# Patient Record
Sex: MALE | Race: WHITE | HISPANIC OR LATINO | Employment: UNEMPLOYED | URBAN - METROPOLITAN AREA
[De-identification: names, ages, dates, MRNs, and addresses within clinical notes are randomized per-mention and may not be internally consistent; named-entity substitution may affect disease eponyms.]

---

## 2023-11-15 ENCOUNTER — HOSPITAL ENCOUNTER (EMERGENCY)
Facility: HOSPITAL | Age: 33
Discharge: HOME | End: 2023-11-15
Attending: EMERGENCY MEDICINE
Payer: MEDICAID

## 2023-11-15 VITALS
DIASTOLIC BLOOD PRESSURE: 101 MMHG | SYSTOLIC BLOOD PRESSURE: 154 MMHG | HEART RATE: 103 BPM | HEIGHT: 65 IN | BODY MASS INDEX: 47.03 KG/M2 | OXYGEN SATURATION: 96 % | TEMPERATURE: 98.8 F | RESPIRATION RATE: 18 BRPM | WEIGHT: 282.3 LBS

## 2023-11-15 PROCEDURE — 4500999001 HC ED NO CHARGE: Performed by: EMERGENCY MEDICINE

## 2023-11-15 PROCEDURE — 99281 EMR DPT VST MAYX REQ PHY/QHP: CPT | Performed by: EMERGENCY MEDICINE

## 2023-11-15 ASSESSMENT — PAIN - FUNCTIONAL ASSESSMENT: PAIN_FUNCTIONAL_ASSESSMENT: FLACC (FACE, LEGS, ACTIVITY, CRY, CONSOLABILITY)

## 2023-11-15 ASSESSMENT — COLUMBIA-SUICIDE SEVERITY RATING SCALE - C-SSRS
1. IN THE PAST MONTH, HAVE YOU WISHED YOU WERE DEAD OR WISHED YOU COULD GO TO SLEEP AND NOT WAKE UP?: NO
6. HAVE YOU EVER DONE ANYTHING, STARTED TO DO ANYTHING, OR PREPARED TO DO ANYTHING TO END YOUR LIFE?: NO
2. HAVE YOU ACTUALLY HAD ANY THOUGHTS OF KILLING YOURSELF?: NO

## 2023-12-27 ENCOUNTER — APPOINTMENT (OUTPATIENT)
Dept: RADIOLOGY | Facility: HOSPITAL | Age: 33
End: 2023-12-27
Payer: MEDICAID

## 2023-12-27 ENCOUNTER — PHARMACY VISIT (OUTPATIENT)
Dept: PHARMACY | Facility: CLINIC | Age: 33
End: 2023-12-27
Payer: MEDICAID

## 2023-12-27 ENCOUNTER — HOSPITAL ENCOUNTER (EMERGENCY)
Facility: HOSPITAL | Age: 33
Discharge: HOME | End: 2023-12-27
Attending: EMERGENCY MEDICINE
Payer: MEDICAID

## 2023-12-27 VITALS
WEIGHT: 250 LBS | DIASTOLIC BLOOD PRESSURE: 88 MMHG | HEART RATE: 89 BPM | TEMPERATURE: 98.6 F | HEIGHT: 72 IN | SYSTOLIC BLOOD PRESSURE: 120 MMHG | OXYGEN SATURATION: 96 % | BODY MASS INDEX: 33.86 KG/M2 | RESPIRATION RATE: 16 BRPM

## 2023-12-27 DIAGNOSIS — K04.7 DENTAL ABSCESS: Primary | ICD-10-CM

## 2023-12-27 LAB
ANION GAP SERPL CALC-SCNC: 14 MMOL/L (ref 10–20)
BASOPHILS # BLD AUTO: 0.03 X10*3/UL (ref 0–0.1)
BASOPHILS NFR BLD AUTO: 0.2 %
BUN SERPL-MCNC: 12 MG/DL (ref 6–23)
CALCIUM SERPL-MCNC: 9.2 MG/DL (ref 8.6–10.3)
CHLORIDE SERPL-SCNC: 104 MMOL/L (ref 98–107)
CO2 SERPL-SCNC: 26 MMOL/L (ref 21–32)
CREAT SERPL-MCNC: 1.37 MG/DL (ref 0.5–1.3)
EOSINOPHIL # BLD AUTO: 0.03 X10*3/UL (ref 0–0.7)
EOSINOPHIL NFR BLD AUTO: 0.2 %
ERYTHROCYTE [DISTWIDTH] IN BLOOD BY AUTOMATED COUNT: 14.2 % (ref 11.5–14.5)
GFR SERPL CREATININE-BSD FRML MDRD: 70 ML/MIN/1.73M*2
GLUCOSE SERPL-MCNC: 89 MG/DL (ref 74–99)
HCT VFR BLD AUTO: 43.9 % (ref 41–52)
HGB BLD-MCNC: 14.1 G/DL (ref 13.5–17.5)
IMM GRANULOCYTES # BLD AUTO: 0.05 X10*3/UL (ref 0–0.7)
IMM GRANULOCYTES NFR BLD AUTO: 0.4 % (ref 0–0.9)
LYMPHOCYTES # BLD AUTO: 1.55 X10*3/UL (ref 1.2–4.8)
LYMPHOCYTES NFR BLD AUTO: 11.4 %
MCH RBC QN AUTO: 27.5 PG (ref 26–34)
MCHC RBC AUTO-ENTMCNC: 32.1 G/DL (ref 32–36)
MCV RBC AUTO: 86 FL (ref 80–100)
MONOCYTES # BLD AUTO: 1.4 X10*3/UL (ref 0.1–1)
MONOCYTES NFR BLD AUTO: 10.3 %
NEUTROPHILS # BLD AUTO: 10.57 X10*3/UL (ref 1.2–7.7)
NEUTROPHILS NFR BLD AUTO: 77.5 %
NRBC BLD-RTO: 0 /100 WBCS (ref 0–0)
PLATELET # BLD AUTO: 315 X10*3/UL (ref 150–450)
POTASSIUM SERPL-SCNC: 3.9 MMOL/L (ref 3.5–5.3)
RBC # BLD AUTO: 5.12 X10*6/UL (ref 4.5–5.9)
SODIUM SERPL-SCNC: 140 MMOL/L (ref 136–145)
WBC # BLD AUTO: 13.6 X10*3/UL (ref 4.4–11.3)

## 2023-12-27 PROCEDURE — 99284 EMERGENCY DEPT VISIT MOD MDM: CPT | Performed by: EMERGENCY MEDICINE

## 2023-12-27 PROCEDURE — 70491 CT SOFT TISSUE NECK W/DYE: CPT

## 2023-12-27 PROCEDURE — 36415 COLL VENOUS BLD VENIPUNCTURE: CPT

## 2023-12-27 PROCEDURE — 85025 COMPLETE CBC W/AUTO DIFF WBC: CPT

## 2023-12-27 PROCEDURE — 70491 CT SOFT TISSUE NECK W/DYE: CPT | Performed by: STUDENT IN AN ORGANIZED HEALTH CARE EDUCATION/TRAINING PROGRAM

## 2023-12-27 PROCEDURE — 80048 BASIC METABOLIC PNL TOTAL CA: CPT

## 2023-12-27 PROCEDURE — RXMED WILLOW AMBULATORY MEDICATION CHARGE

## 2023-12-27 PROCEDURE — 2550000001 HC RX 255 CONTRASTS

## 2023-12-27 RX ORDER — CLINDAMYCIN HYDROCHLORIDE 150 MG/1
300 CAPSULE ORAL 3 TIMES DAILY
Qty: 60 CAPSULE | Refills: 0 | Status: SHIPPED | OUTPATIENT
Start: 2023-12-27 | End: 2024-01-06

## 2023-12-27 RX ADMIN — IOHEXOL 75 ML: 350 INJECTION, SOLUTION INTRAVENOUS at 19:47

## 2023-12-27 ASSESSMENT — COLUMBIA-SUICIDE SEVERITY RATING SCALE - C-SSRS
2. HAVE YOU ACTUALLY HAD ANY THOUGHTS OF KILLING YOURSELF?: NO
6. HAVE YOU EVER DONE ANYTHING, STARTED TO DO ANYTHING, OR PREPARED TO DO ANYTHING TO END YOUR LIFE?: NO
1. IN THE PAST MONTH, HAVE YOU WISHED YOU WERE DEAD OR WISHED YOU COULD GO TO SLEEP AND NOT WAKE UP?: NO

## 2023-12-27 ASSESSMENT — PAIN SCALES - GENERAL: PAINLEVEL_OUTOF10: 10 - WORST POSSIBLE PAIN

## 2023-12-27 ASSESSMENT — PAIN - FUNCTIONAL ASSESSMENT: PAIN_FUNCTIONAL_ASSESSMENT: 0-10

## 2023-12-28 NOTE — DISCHARGE INSTRUCTIONS
If you are unable to be seen at your dentist, please call the oral maxillofacial surgeon listed above for further evaluation    Please take all antibiotics as prescribed even if symptoms improve    The antibiotics are not definitive care for your situation, your situation requires the affected tooth to be pulled out or infection will continue to recur

## 2023-12-28 NOTE — ED PROVIDER NOTES
Chief Complaint   Patient presents with    concern for lock jaw       33-year-old male arrives to the emergency department with a chief complaint of right facial swelling with a chronic dental abscess.  The patient states that he attempted to go to his dentist, however he was unable to open his mouth secondary to pain.  The patient has finished a 10-day regimen of penicillin with potassium for what has been a chronic dental abscess on the lower right, the patient was not able to have the tooth pulled.  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  On initial assessment, the patient is forcefully holding his jaw closed speaking between his teeth and attempting to motion things with hand stating that he cannot talk, the patient was speaking clearly to his family members in the room prior to my arrival as I walked in.  The patient is autistic, though is high functioning.  The patient is hemodynamically stable claiming that he is in a 10 out of 10 pain.  Patient is not able/willing to open his mouth for any inspection or oral assessment.  The patient does have a significant amount of swelling on the right side of his face including his right jaw and neck with cervical node involvement unilaterally on the right.  Patient is tender to the touch.      History provided by:  Patient   used: No         PmHx, PsHx, Allergies, Family Hx, social Hx reviewed as documented    Given the focused nature of the complaint, only related components review of systems were evaluated, and abnormalities indicated in HPI    Physical Exam:    General: Patient is AAOx3, appears well developed, well nourished, is a good historian, answers questions appropriately    HEENT: head normocephalic, atraumatic, PERRLA, EOMs intact, unable to assess the oropharynx secondary to the patient not being able to open his mouth.    Pulmonary: CTAB, no accessory muscle use, able to speak full clear sentences    Cardiac: HRRR, no murmurs, rubs  or gallops    GI: soft, non-tender, non-distended    Musculoskeletal: full weight bearing, LUNA, no joint effusions, clubbing or edema noted    Skin: Right-sided facial/neck swelling per HPI intact, no lesions or rashes noted, turgor is good.    Medical Decision Making  This patient was seen, treated, and evaluated in conjunction with Dr. Asad Yuen    Primary consideration for this patient is that he is unwilling to open his mouth secondary to the discomfort, other consideration that there is a limiting abscess, or damage from the chronic abscess that is leaving the patient unable to open his mouth.  A CT of soft tissue neck extending up into the right mandible will be used to further evaluate, this will be done with IV contrast requiring diagnostic blood work to further evaluate.    The patient's diagnostic blood work shows mild leukocytosis with a white blood cell count of 13.6, the patient's creatinine is shown to be slightly elevated 1.37, however I have no comparative studies, patient has no history of CKD.    The patient's CAT scan shows multiple areas of concern, that is very specific to the maxillofacial region.  Dr. Anaya with [facility] was consulted, the patient's CAT scan was reviewed in its entirety, he states that this read is very typical for this type of recurrent/chronic abscess, and that the patient requires the infected tooth to be pulled as definitive care, and that he does not require IV antibiotics.  He recommends clindamycin secondary to the patient already having Pen-Vee K and follow-up with his dentist.  If the patient cannot get into his dentist he is happy to see him in the office for definitive care.    The patient started on clindamycin 300 mg 3 times a day for 10 days, medications filled via meds to bed.     Upon reassessment of the patient by the attending physician, the patient was initially talking with his teeth clenched together refusing to open the mouth for the attending to assess,  once the patient was told that he was going to be discharged and follow-up, the patient clearly asked the IV to be taken out open his mouth to speak, indicating a behavioral component to the patient's presentation.     The patient will finish all antibiotics, take over-the-counter analgesics as needed, follow-up with his established dentist, if not able will follow-up with [facility] as referred    Amount and/or Complexity of Data Reviewed  Labs: ordered. Decision-making details documented in ED Course.  Radiology: ordered. Decision-making details documented in ED Course.       Diagnoses as of 12/28/23 0219   Dental abscess       The patient has had the following imaging during this ER visit: CT SOFT TISSUE NECK W IV CONTRAST  INSERT PERIPHERAL IV     Patient History   No past medical history on file.  No past surgical history on file.  No family history on file.  Social History     Tobacco Use    Smoking status: Not on file    Smokeless tobacco: Not on file   Substance Use Topics    Alcohol use: Not on file    Drug use: Not on file       ED Triage Vitals [12/27/23 1500]   Temp Heart Rate Resp BP   37 °C (98.6 °F) 106 16 119/86      SpO2 Temp Source Heart Rate Source Patient Position   96 % Temporal Monitor Sitting      BP Location FiO2 (%)     Right arm --       Vitals:    12/27/23 1500 12/27/23 2143   BP: 119/86 120/88   BP Location: Right arm    Patient Position: Sitting    Pulse: 106 89   Resp: 16 16   Temp: 37 °C (98.6 °F)    TempSrc: Temporal    SpO2: 96% 96%   Weight: 113 kg (250 lb)    Height: 1.829 m (6')                BONITA Ho-CNP  12/28/23 0219

## 2024-08-02 ENCOUNTER — APPOINTMENT (OUTPATIENT)
Dept: PRIMARY CARE | Facility: CLINIC | Age: 34
End: 2024-08-02
Payer: MEDICAID

## 2024-08-02 VITALS
BODY MASS INDEX: 42.66 KG/M2 | OXYGEN SATURATION: 98 % | WEIGHT: 298 LBS | TEMPERATURE: 98.2 F | DIASTOLIC BLOOD PRESSURE: 62 MMHG | HEIGHT: 70 IN | SYSTOLIC BLOOD PRESSURE: 114 MMHG | HEART RATE: 81 BPM

## 2024-08-02 DIAGNOSIS — H53.8 BLURRY VISION: ICD-10-CM

## 2024-08-02 DIAGNOSIS — N53.12 PAIN WITH EJACULATION: ICD-10-CM

## 2024-08-02 DIAGNOSIS — R31.9 HEMATURIA, UNSPECIFIED TYPE: ICD-10-CM

## 2024-08-02 DIAGNOSIS — K64.9 ACUTE HEMORRHOID: ICD-10-CM

## 2024-08-02 DIAGNOSIS — L20.82 FLEXURAL ECZEMA: Primary | ICD-10-CM

## 2024-08-02 LAB
POC APPEARANCE, URINE: CLEAR
POC BILIRUBIN, URINE: NEGATIVE
POC BLOOD, URINE: ABNORMAL
POC COLOR, URINE: YELLOW
POC GLUCOSE, URINE: NEGATIVE MG/DL
POC KETONES, URINE: NEGATIVE MG/DL
POC LEUKOCYTES, URINE: NEGATIVE
POC NITRITE,URINE: NEGATIVE
POC PH, URINE: 6.5 PH
POC PROTEIN, URINE: NEGATIVE MG/DL
POC SPECIFIC GRAVITY, URINE: 1.01
POC UROBILINOGEN, URINE: 0.2 EU/DL

## 2024-08-02 PROCEDURE — 87491 CHLMYD TRACH DNA AMP PROBE: CPT

## 2024-08-02 PROCEDURE — 87086 URINE CULTURE/COLONY COUNT: CPT

## 2024-08-02 PROCEDURE — 99203 OFFICE O/P NEW LOW 30 MIN: CPT | Performed by: FAMILY MEDICINE

## 2024-08-02 PROCEDURE — 81003 URINALYSIS AUTO W/O SCOPE: CPT | Performed by: FAMILY MEDICINE

## 2024-08-02 PROCEDURE — 3008F BODY MASS INDEX DOCD: CPT | Performed by: FAMILY MEDICINE

## 2024-08-02 PROCEDURE — 87591 N.GONORRHOEAE DNA AMP PROB: CPT

## 2024-08-02 PROCEDURE — 1036F TOBACCO NON-USER: CPT | Performed by: FAMILY MEDICINE

## 2024-08-02 RX ORDER — CIPROFLOXACIN 500 MG/1
500 TABLET ORAL 2 TIMES DAILY
Qty: 10 TABLET | Refills: 0 | Status: SHIPPED | OUTPATIENT
Start: 2024-08-02 | End: 2024-08-07

## 2024-08-02 RX ORDER — IBUPROFEN 200 MG
200 TABLET ORAL AS NEEDED
COMMUNITY

## 2024-08-02 RX ORDER — TRIAMCINOLONE ACETONIDE 1 MG/G
CREAM TOPICAL 2 TIMES DAILY
Qty: 80 G | Refills: 1 | Status: SHIPPED | OUTPATIENT
Start: 2024-08-02

## 2024-08-02 RX ORDER — HYDROCORTISONE ACETATE PRAMOXINE HCL 2.5; 1 G/100G; G/100G
CREAM TOPICAL 2 TIMES DAILY
Qty: 30 G | Refills: 0 | Status: SHIPPED | OUTPATIENT
Start: 2024-08-02 | End: 2024-08-16

## 2024-08-02 RX ORDER — POLYETHYLENE GLYCOL 3350 17 G/17G
17 POWDER, FOR SOLUTION ORAL DAILY
Qty: 30 PACKET | Refills: 1 | Status: SHIPPED | OUTPATIENT
Start: 2024-08-02 | End: 2024-10-01

## 2024-08-02 NOTE — PATIENT INSTRUCTIONS
Pain with ejaculation: take antibiotic 1 pill twice a day for 5 days, if no better follow up with urologist  Rash: use kenalog cream   Hemorrhoid: take miralax daily until stool is soft then take it every other day , start using rectal cream to help with pain

## 2024-08-02 NOTE — PROGRESS NOTES
Assessment     ASSESSMENT/PLAN:      Patient Instructions   Pain with ejaculation: take antibiotic 1 pill twice a day for 5 days, if no better follow up with urologist  Rash: use kenalog cream   Hemorrhoid: take miralax daily until stool is soft then take it every other day , start using rectal cream to help with pain       Signed by: Racheal Lema DO       FUTURE DIRECTION:   []    Subjective   SUBJECTIVE:     HPI : Patient is a 33 y.o. male who presents today for the following:     Bladder infection   Pt mention having history of bladder infection many years ago that has been recurrent. Over the past 2 months he has been experiencing pelvic pain that is described as cramping. He feels that thismaybe due to ejaculation because he has been ejaculating less     Eczema  He also has a rash on his elbow and mentions he was diagnosed with eczema. [place] this occurs when humidity is high     Hemorrhoid   Ongoing for a lng time 'has been having difficulty moving bowel  States he does have     Glaucoma   Mentions history of glaucoma, was seeing ophti but has stopped  Now complaining of blurry vision     Review of Systems    History reviewed. No pertinent past medical history.     History reviewed. No pertinent surgical history.     Current Outpatient Medications   Medication Instructions    ciprofloxacin (CIPRO) 500 mg, oral, 2 times daily    hydrocortisone-pramoxine (Analpram-HC) 2.5-1 % cream rectal, 2 times daily    ibuprofen 200 mg, oral, As needed    polyethylene glycol (GLYCOLAX, MIRALAX) 17 g, oral, Daily, Mix 1 cap (17g) into 8 ounces of fluid.    triamcinolone (Kenalog) 0.1 % cream Topical, 2 times daily, Apply to affected area 1-2 times daily as needed. Avoid face and groin.        No Known Allergies     Social History     Socioeconomic History    Marital status: Single     Spouse name: Not on file    Number of children: Not on file    Years of education: Not on file    Highest education level: Not on  "file   Occupational History    Not on file   Tobacco Use    Smoking status: Never     Passive exposure: Past    Smokeless tobacco: Never   Substance and Sexual Activity    Alcohol use: Never    Drug use: Never    Sexual activity: Not on file   Other Topics Concern    Not on file   Social History Narrative    Not on file     Social Determinants of Health     Financial Resource Strain: Not on file   Food Insecurity: Not on file   Transportation Needs: Not on file   Physical Activity: Not on file   Stress: Not on file   Social Connections: Not on file   Intimate Partner Violence: Not on file   Housing Stability: Not on file        No family history on file.     Objective     OBJECTIVE:     Vitals:    08/02/24 1440   BP: 114/62   Pulse: 81   Temp: 36.8 °C (98.2 °F)   SpO2: 98%   Weight: 135 kg (298 lb)   Height: 1.778 m (5' 10\")        Physical Exam  Constitutional:       Appearance: He is obese.   HENT:      Head: Normocephalic and atraumatic.      Nose: Nose normal.      Mouth/Throat:      Mouth: Mucous membranes are moist.   Eyes:      Pupils: Pupils are equal, round, and reactive to light.   Cardiovascular:      Rate and Rhythm: Normal rate and regular rhythm.      Pulses: Normal pulses.      Heart sounds: No murmur heard.  Pulmonary:      Effort: Pulmonary effort is normal.      Breath sounds: Normal breath sounds.   Abdominal:      Tenderness: There is abdominal tenderness.   Musculoskeletal:         General: Normal range of motion.      Cervical back: Normal range of motion.   Skin:     General: Skin is warm and dry.   Neurological:      Mental Status: He is alert.   Psychiatric:         Mood and Affect: Mood normal.             "

## 2024-08-03 LAB
C TRACH RRNA SPEC QL NAA+PROBE: NEGATIVE
N GONORRHOEA DNA SPEC QL PROBE+SIG AMP: NEGATIVE

## 2024-08-04 LAB — BACTERIA UR CULT: NO GROWTH
